# Patient Record
Sex: MALE | URBAN - METROPOLITAN AREA
[De-identification: names, ages, dates, MRNs, and addresses within clinical notes are randomized per-mention and may not be internally consistent; named-entity substitution may affect disease eponyms.]

---

## 2019-10-30 ENCOUNTER — OFFICE VISIT (OUTPATIENT)
Dept: URBAN - METROPOLITAN AREA CLINIC 76 | Facility: CLINIC | Age: 39
End: 2019-10-30
Payer: COMMERCIAL

## 2019-10-30 PROCEDURE — 92310 CONTACT LENS FITTING OU: CPT | Performed by: OPTOMETRIST

## 2019-10-30 PROCEDURE — 92004 COMPRE OPH EXAM NEW PT 1/>: CPT | Performed by: OPTOMETRIST

## 2019-10-30 ASSESSMENT — INTRAOCULAR PRESSURE
OD: 14
OS: 14

## 2019-10-30 ASSESSMENT — KERATOMETRY
OD: 44.88
OS: 44.25

## 2019-10-30 ASSESSMENT — VISUAL ACUITY
OS: 20/20
OD: 20/20

## 2019-10-30 NOTE — IMPRESSION/PLAN
Impression: Myopia, bilateral: H52.13. OU. patient happy w/ current astigmatism correction in CTL's. Plan: A glasses prescription has been discussed and generated. Patient to call with any concerns. Patient will call w/ current CTL's info. will order trials and finalize CL Rx.
quoted $25 CTL's fee w/ info, if patient cannot find CTL's info, fee will be $75. CL f/u if problems with trials.

## 2021-04-28 ENCOUNTER — OFFICE VISIT (OUTPATIENT)
Dept: URBAN - METROPOLITAN AREA CLINIC 76 | Facility: CLINIC | Age: 41
End: 2021-04-28
Payer: COMMERCIAL

## 2021-04-28 DIAGNOSIS — H52.13 MYOPIA, BILATERAL: Primary | ICD-10-CM

## 2021-04-28 PROCEDURE — 92014 COMPRE OPH EXAM EST PT 1/>: CPT | Performed by: OPTOMETRIST

## 2021-04-28 PROCEDURE — 92310 CONTACT LENS FITTING OU: CPT | Performed by: OPTOMETRIST

## 2021-04-28 ASSESSMENT — KERATOMETRY
OS: 44.13
OD: 44.63

## 2021-04-28 ASSESSMENT — VISUAL ACUITY
OD: 20/20
OS: 20/25

## 2021-04-28 NOTE — IMPRESSION/PLAN
Impression: Myopia, bilateral: H52.13. Plan: A glasses and contact lens prescription has been discussed and generated. Give contact lens trials to the patient. The patient should try and approve them. No contact lens follow-up is needed unless the patient has concerns with the final prescription.

## 2022-04-27 ENCOUNTER — OFFICE VISIT (OUTPATIENT)
Dept: URBAN - METROPOLITAN AREA CLINIC 76 | Facility: CLINIC | Age: 42
End: 2022-04-27
Payer: COMMERCIAL

## 2022-04-27 DIAGNOSIS — H52.13 MYOPIA, BILATERAL: Primary | ICD-10-CM

## 2022-04-27 PROCEDURE — 92012 INTRM OPH EXAM EST PATIENT: CPT | Performed by: OPTOMETRIST

## 2022-04-27 PROCEDURE — 92310 CONTACT LENS FITTING OU: CPT | Performed by: OPTOMETRIST

## 2022-04-27 ASSESSMENT — KERATOMETRY
OD: 44.25
OS: 44.50

## 2022-04-27 ASSESSMENT — INTRAOCULAR PRESSURE
OS: 15
OD: 15

## 2022-04-27 ASSESSMENT — VISUAL ACUITY
OD: 20/20
OS: 20/20

## 2022-04-27 NOTE — IMPRESSION/PLAN
Impression: Myopia, bilateral: H52.13. Plan: A glasses prescription has been discussed and generated. A contact lens prescription has also been discussed and generated. Recommend changing ctls more frequently, at least q2 wks if pt sleeps in them. Patient to call with any concerns. The patient declined a dilated exam today. The benefits of a dilation were explained.